# Patient Record
Sex: FEMALE
[De-identification: names, ages, dates, MRNs, and addresses within clinical notes are randomized per-mention and may not be internally consistent; named-entity substitution may affect disease eponyms.]

---

## 2024-03-27 PROBLEM — Z00.00 ENCOUNTER FOR PREVENTIVE HEALTH EXAMINATION: Status: ACTIVE | Noted: 2024-03-27

## 2024-04-01 ENCOUNTER — NON-APPOINTMENT (OUTPATIENT)
Age: 39
End: 2024-04-01

## 2024-04-03 ENCOUNTER — NON-APPOINTMENT (OUTPATIENT)
Age: 39
End: 2024-04-03

## 2024-04-29 NOTE — PLAN
[TextEntry] : Tentative Plan: Reviewed imaging findings and discussed results with patient. Discussed benign nature of oil cysts following surgery. She is to get annual B/l MG & US in February. She is to return PRN.

## 2024-04-29 NOTE — HISTORY OF PRESENT ILLNESS
[FreeTextEntry1] : Patient is a 39yo F who presents today for initial evaluation of right breast oil cyst seen on baseline imaging 2/2024. She was referred by [facility]. She has hx of b/l reduction mammoplasty?? Denies family history of breast or ovarian cancer. Patient denies palpable masses, skin changes, or nipple discharge bilaterally.  BRIANNA Lifetime Risk- ???  2/15/24: B/l MG/US (baseline)- scattered fibroglandular. B/l post-reduction arch distortion. R benign appearing oil cyst superior breast. BI-RADS 2

## 2024-04-29 NOTE — PHYSICAL EXAM
[Normocephalic] : normocephalic [EOMI] : extra ocular movement intact [Supple] : supple [No Supraclavicular Adenopathy] : no supraclavicular adenopathy [No Cervical Adenopathy] : no cervical adenopathy [de-identified] : Bilateral breast/axilla/supraclavicular area: No masses, discharge, or adenopathy

## 2024-05-06 ENCOUNTER — APPOINTMENT (OUTPATIENT)
Dept: BREAST CENTER | Facility: CLINIC | Age: 39
End: 2024-05-06

## 2024-05-16 ENCOUNTER — APPOINTMENT (OUTPATIENT)
Dept: BREAST CENTER | Facility: CLINIC | Age: 39
End: 2024-05-16
Payer: COMMERCIAL

## 2024-05-16 VITALS
DIASTOLIC BLOOD PRESSURE: 74 MMHG | HEIGHT: 71 IN | SYSTOLIC BLOOD PRESSURE: 107 MMHG | WEIGHT: 198 LBS | HEART RATE: 60 BPM | BODY MASS INDEX: 27.72 KG/M2

## 2024-05-16 DIAGNOSIS — Z80.1 FAMILY HISTORY OF MALIGNANT NEOPLASM OF TRACHEA, BRONCHUS AND LUNG: ICD-10-CM

## 2024-05-16 DIAGNOSIS — Z80.0 FAMILY HISTORY OF MALIGNANT NEOPLASM OF DIGESTIVE ORGANS: ICD-10-CM

## 2024-05-16 DIAGNOSIS — N60.01 SOLITARY CYST OF RIGHT BREAST: ICD-10-CM

## 2024-05-16 DIAGNOSIS — N64.4 MASTODYNIA: ICD-10-CM

## 2024-05-16 DIAGNOSIS — Z78.9 OTHER SPECIFIED HEALTH STATUS: ICD-10-CM

## 2024-05-16 PROCEDURE — 99204 OFFICE O/P NEW MOD 45 MIN: CPT

## 2024-05-16 RX ORDER — DROSPIRENONE AND ETHINYL ESTRADIOL 0.02-3(28)
KIT ORAL
Refills: 0 | Status: ACTIVE | COMMUNITY

## 2024-05-16 NOTE — DATA REVIEWED
[FreeTextEntry1] : 2/15/24 (LR) bilateral mammogram and US: scattered fibroglandular. B/l post-reduction arch distortion. R benign appearing oil cyst superior breast. BI-RADS 2

## 2024-05-16 NOTE — HISTORY OF PRESENT ILLNESS
[FreeTextEntry1] : Patient is a 37yo female who presents today for initial evaluation of right breast oil cyst seen on baseline imaging 2/2024 and breast pain.. She was referred by [facility]. She has hx of b/l reduction mammoplasty at [facility] in January 2023.  The patient notes that she has been regaining some sensation over the past year and has been pain and discomfort as a result.  The patient was sent for new baseline mammography in the setting of breast reduction, which identified a benign-appearing oil cyst in the right breast.  Denies family history of breast or ovarian cancer. Patient denies palpable masses, skin changes, or nipple discharge bilaterally.  BRIANNA Lifetime Risk 15.6% denies family history of breast or ovarian cancer

## 2024-05-16 NOTE — ASSESSMENT
[FreeTextEntry1] : 38-year-old female presents for review of imaging and breast pain.  I reviewed the pathogenesis of oil cysts in the setting of her history of a breast reduction.  I explained to the patient that this is a benign finding and no specific follow-up is needed.  Discussed etiologies of breast pain including hormonal changes, scar tissue, benign entities such as cysts, reactive lymph nodes, musculoskeletal issues and rarely malignancy. Reviewed the use of OTC Ibuprofen, warm/cold compresses, stretching, minimizing caffeine intake, wearing a sports bra, & Salon Pas patches.  I encouraged the patient to specifically apply warm compresses followed by self massage, of the scar tissue, a few times a week.  Discussed the importance of breast self awareness. Encouraged the patient to become familiar with her tissue so as to be aware of any changes from her baseline.  Discussed that recommendations for breast cancer surveillance in an average risk woman should be annual mammograms, with supplemental ultrasound for those with dense breasts, to start at the age of 40 years old.  Given that she has already completed baseline imaging, and she is >34yo, can continue with annual mammography moving forward.  Will review the results of next year's mammography, which was due in February, if that is benign she can return to the office as needed, and resume care for future annual imaging under the guidance of her gynecologist or primary care provider.  All questions were answered; the patient verbalized understanding and is in agreement with the plan.

## 2024-05-16 NOTE — REVIEW OF SYSTEMS
[As Noted in HPI] : as noted in HPI [Breast Lump] : breast lump [Negative] : Musculoskeletal [Breast Pain] : no breast pain

## 2024-05-16 NOTE — PAST MEDICAL HISTORY
[Menarche Age ____] : age at menarche was [unfilled] [Total Preg ___] : G[unfilled] [History of Hormone Replacement Treatment] : has no history of hormone replacement treatment [FreeTextEntry3] : birth control 5 yrs. [FreeTextEntry6] : no [FreeTextEntry7] : yes  [FreeTextEntry8] : n/a

## 2024-05-16 NOTE — PHYSICAL EXAM
[Normocephalic] : normocephalic [No Supraclavicular Adenopathy] : no supraclavicular adenopathy [Examined in the supine and seated position] : examined in the supine and seated position [Symmetrical] : symmetrical [No dominant masses] : no dominant masses in right breast  [No dominant masses] : no dominant masses left breast [No Nipple Retraction] : no left nipple retraction [No Nipple Discharge] : no left nipple discharge [No Axillary Lymphadenopathy] : no left axillary lymphadenopathy [No Edema] : no edema [No Rashes] : no rashes [No Ulceration] : no ulceration [Breast Nipple Inversion] : nipples not inverted [Breast Nipple Retraction] : nipples not retracted [Breast Nipple Flattening] : nipples not flattened [Breast Nipple Fissures] : nipples not fissured [de-identified] : sensitive to palpation throughout exam; no dominant masses bilaterally.  Bilateral mammoplasty incisions are well-healed

## 2024-06-13 ENCOUNTER — APPOINTMENT (OUTPATIENT)
Dept: BREAST CENTER | Facility: CLINIC | Age: 39
End: 2024-06-13